# Patient Record
(demographics unavailable — no encounter records)

---

## 2025-06-12 NOTE — ASSESSMENT
[FreeTextEntry1] : 67 yr old joint pain in left hand /hand OA, GERD, HTN, HLD  Reports  he saw hand ortho for left hand pain ; was told he had hand OA and needs surgery Has seen a rheumatologist in [place] in 2024 and was given injection for hand OA which did not help In [place], was given injection 2 years ago by hand ortho  (2023) He saw ortho again in [place] in april and was told he needs to get surgery He has hand stiffness in AM, pain in left CMC joint  no pain in right hand Reports advil helps and using ice helps  - Hand OA and has seen ortho and was advised surgery - He can alternate Advil with Tylenol arthritis. However, advised him to be careful with using too much Advil given side effects of kidney injury and GI irritation/ulcers/GERD. Can also use ice or heat as needed - Follow up with hand ortho    Total time spent in review of patient history, clinical exam, management, counseling, and plan of care:  45min

## 2025-06-12 NOTE — HISTORY OF PRESENT ILLNESS
[FreeTextEntry1] : 67 yr old joint pain in left hand /hand OA, GERD, HTN, HLD  Reports  he saw hand ortho for left hand pain ; was told he had hand OA and needs surgery Has seen a rheumatologist in [place] in 2024 and was given injection for hand OA which did not help In [place], was given injection 2 years ago by hand ortho  (2023) He saw ortho again in [place] in april and was told he needs to get surgery He has hand stiffness in AM, pain in left CMC joint  no pain in right hand Reports advil helps  Patient denies other joint pains, joint swelling, joint erythema/warmth, fatigue, fever, chills, weight loss, nasopharyngeal ulcers, chest pain, abdominal pain, , cough, SOB, nausea, vomiting, diarrhea, constipation, blood in stool, dysuria, , rash, , Raynaud's, , dry eyes, dry mouth, , eye pain/redness, vision changes, jaw claudication, dysphagia, numbness/tingling, , Hx of DVT/PEs.     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

## 2025-06-12 NOTE — PHYSICAL EXAM
[TextEntry] :   GENERAL: Appears in no acute distress HEENT: EOMI. No conjunctival erythema. Moist mucous membranes. No nasopharyngeal ulcers NECK: Supple, no cervical lymphadenopathy CARDIOVASCULAR: RRR. S1, S2 auscultated. PULMONARY: Clear to auscultation b/l,  ABDOMINAL: Soft, nontender, nondistended. MSK: No active synovitis, swelling, erythema, or warmth. +joint tenderness to palpation of left CMC joint  Heberdens nodes. No deformities. Normal ROM of neck, back, b/l upper and lower extremities. SKIN: No lesions or rashes NEURO: No focal deficits. Motor strength 5/5 in major muscle groups of b/l UE and LE. PSYCH:  Normal affect and thought process.